# Patient Record
Sex: FEMALE | Race: WHITE | URBAN - METROPOLITAN AREA
[De-identification: names, ages, dates, MRNs, and addresses within clinical notes are randomized per-mention and may not be internally consistent; named-entity substitution may affect disease eponyms.]

---

## 2018-11-29 ENCOUNTER — OFFICE VISIT (OUTPATIENT)
Dept: SLEEP MEDICINE | Facility: CLINIC | Age: 48
End: 2018-11-29
Payer: MEDICAID

## 2018-11-29 VITALS
OXYGEN SATURATION: 97 % | SYSTOLIC BLOOD PRESSURE: 115 MMHG | HEIGHT: 62 IN | RESPIRATION RATE: 16 BRPM | WEIGHT: 180 LBS | HEART RATE: 79 BPM | BODY MASS INDEX: 33.13 KG/M2 | DIASTOLIC BLOOD PRESSURE: 83 MMHG

## 2018-11-29 DIAGNOSIS — R40.0 SLEEPINESS: Primary | ICD-10-CM

## 2018-11-29 DIAGNOSIS — G47.00 INSOMNIA, UNSPECIFIED TYPE: ICD-10-CM

## 2018-11-29 DIAGNOSIS — G71.11 MYOTONIC DYSTROPHY (H): ICD-10-CM

## 2018-11-29 PROCEDURE — 99204 OFFICE O/P NEW MOD 45 MIN: CPT | Performed by: PHYSICIAN ASSISTANT

## 2018-11-29 RX ORDER — GLUCOSAMINE/D3/BOSWELLIA SERRA 1500MG-400
TABLET ORAL
COMMUNITY

## 2018-11-29 NOTE — LETTER
11/29/2018        RE: Rae Peters  [address] [facility]  [address]  [facility] [address]            Sleep Consultation:    Date on this visit: 11/29/2018    Rae Peters is sent by No ref. provider found for a sleep consultation regarding LEV.    Primary Physician: Jannette Funk     Rae Peters presents for evaluation of sleep apnea at the request of her neurologist. She has difficulty falling asleep but daytime sleepiness. Her medical history is significant for myotonic dystrophy and depression.    Rae goes to sleep at 9:00-10:00 PM during the week. She wakes up at 7:30 AM with an alarm. She does hit the snooze for 10 minutes. She falls asleep in 30 minutes with Tylenol PM.  Rae has difficulty falling asleep without it. She takes Tylenol PM. She has been taking it more regularly since having a foot surgery. She has a racing mind.  She wakes up 1-2 times a night for 30 minutes before falling back to sleep.  Rae wakes up to go to the bathroom and worrying.  On weekends, Rae goes to sleep at 12:00 AM.  She wakes up at 10:30 AM without an alarm. She falls asleep in 30 minutes.  Patient gets an average of 8-10 hours of sleep per night. She does not feel refreshed from her sleep. That has been the case as long as she can remember. She has tried melatonin in the past but it did not help much.     Patient does use electronics in bed, worry in bed about work and read in bed and does not watch TV in bed.     Rae does not do shift work.  She works day shifts.  She works in [employer]. She lives with her mother. She is .    Rae has never been told that she snores. Patient does not have a regular bed partner. She has not shared a room with anyone since 2015.  There is not report of gasping and snorting.  She does not have witnessed apneas. She has been on oxygen after her last 2 surgeries. She thinks she had pneumonia or bronchitis. Patient sleeps on her side. She  infrequently wakes with headaches. She denies snort arousals and restless legs. She does occasionally have dry mouth in the morning. Rae denies any bruxism, sleep walking, sleep talking, dream enactment, sleep paralysis, cataplexy and hypnogogic/hypnopompic hallucinations.    Rae denies difficulty breathing through her nose, claustrophobia, reflux at night and heartburn.  She takes omeprazole 2-3 times per day. She would get stomach aches. She was recently prescribed Cymbalta, but it made her more tired. She stopped taking it. She feels her memory is very poor. She has brain fog. She has depression related to her son dying from pneumonia.    Rae has not had a significant change in weight in the last 5 years.  Patient describes themself as a night person.  She would prefer to go to sleep at 11:00 PM and wake up at 12:00 PM.  Patient's [place] Sleepiness score 6/24 inconsistent with daytime sleepiness.  She is on methylphenidate XR once daily, unsure of dose. She has been on that for maybe 5-10 years. She says she has no energy regardless of the medication. Her mother says she notices a difference though. She just feels exhausted.     Rae does not take naps. She takes no inadvertant naps.  She denies dozing while driving (except when she was on Cymbalta). Patient was counseled on the importance of driving while alert, to pull over if drowsy, or nap before getting into the vehicle if sleepy.  She uses 1-2 sodas/day. Last caffeine intake is usually before 1 PM.    Allergies:    No Known Allergies    Medications:    Current Outpatient Prescriptions   Medication Sig Dispense Refill     Acetaminophen (TYLENOL PO) Take 500 mg by mouth       Biotin 56973 MCG TABS        diphenhydrAMINE-acetaminophen (TYLENOL PM)  MG tablet Take 2 tablets by mouth nightly as needed for sleep       LORATADINE 10 MG OR TABS 1 TABLET DAILY 30 prn     METHYLPHENIDATE HCL PO        MULTIVITAMINS OR CAPS 1 tab daily 100 prn      OMEPRAZOLE PO        ROBITUSSIN A-C  MG/5ML OR SYRP 1-2 tsp PO QHS PRN cough (Patient not taking: No sig reported) 4 oz 0       Problem List:  Patient Active Problem List    Diagnosis Date Noted     Myotonic dystrophy (H) 11/29/2018     Priority: Medium        Past Medical/Surgical History:  Past Medical History:   Diagnosis Date     Acid reflux      Depressive disorder      Myotonic dystrophy (H)      Past Surgical History:   Procedure Laterality Date     ENT SURGERY      tonsil and adenoidectomy     EYE SURGERY      cataract     HYSTERECTOMY       ORTHOPEDIC SURGERY Right     foot       Social History:  Social History     Social History     Marital status: Single     Spouse name: N/A     Number of children: N/A     Years of education: N/A     Occupational History     Not on file.     Social History Main Topics     Smoking status: Never Smoker     Smokeless tobacco: Never Used     Alcohol use No     Drug use: No     Sexual activity: Not Currently     Other Topics Concern     Not on file     Social History Narrative       Family History:  Family History   Problem Relation Age of Onset     Diabetes No family hx of      Coronary Artery Disease No family hx of      Cerebrovascular Disease No family hx of        Review of Systems:  A complete review of systems reviewed by me is negative with the exeption of what has been mentioned in the history of present illness.  CONSTITUTIONAL: NEGATIVE for weight gain/loss, fever, chills, sweats or night sweats, drug allergies.  EYES: NEGATIVE for blind spots, double vision.  EYES:  POSITIVE for changes in vision  ENT: NEGATIVE for ear pain, sore throat, sinus pain, post-nasal drip, bloody nose  ENT:  POSITIVE for  runny nose  CARDIAC: NEGATIVE for fast heartbeats or fluttering in chest, chest pain or pressure, breathlessness when lying flat, swollen legs or swollen feet.  NEUROLOGIC: NEGATIVE numbness in the arms or legs.  NEUROLOGIC:  POSITIVE for  headaches and  "weakness in the arms or legs  DERMATOLOGIC: NEGATIVE for rashes, new moles or change in mole(s)  PULMONARY: NEGATIVE SOB at rest, dry cough, productive cough, coughing up blood, whistling when breathing.    PULMONARY:  POSITIVE for  SOB with activity and wheezing   GASTROINTESTINAL: NEGATIVE for vomitting, fat or grease in stools, constipation, bowel movements black in color or blood noted.  GASTROINTESTINAL:  POSITIVE for  nausea, loose or watery stools and abdominal pain  GENITOURINARY: NEGATIVE for pain during urination, blood in urine, urinating more frequently than usual, irregular menstrual periods.  MUSCULOSKELETAL: NEGATIVE for swollen joints.  MUSCULOSKELETAL:  POSITIVE for  muscle pain and bone or joint pain  ENDOCRINE: NEGATIVE for increased thirst or urination, diabetes.  LYMPHATIC: NEGATIVE for swollen lymph nodes, lumps or bumps in the breasts or nipple discharge.  MENTAL HEALTH: POSITIVE for depression    Physical Examination:  Vitals: /90  Pulse 79  Resp 16  Ht 1.575 m (5' 2\")  Wt 81.6 kg (180 lb)  SpO2 97%  BMI 32.92 kg/m2  BMI= Body mass index is 32.92 kg/(m^2).    Neck Cir (cm): 34 cm    [place] Total Score 11/29/2018   Total score - [place] 6       KYLAH Total Score: 19 (11/29/18 1513)    GENERAL APPEARANCE: healthy, alert, no distress and cooperative  EYES: Eyes grossly normal to inspection, PERRL, conjunctivae and sclerae normal and lids and lashes normal  HENT: nose and mouth without ulcers or lesions and tongue base enlarged  NECK: no adenopathy, no asymmetry, masses, or scars, thyroid normal to palpation and trachea midline and normal to palpation  RESP: lungs clear to auscultation - no rales, rhonchi or wheezes  CV: regular rates and rhythm, normal S1 S2, no S3 or S4, no murmur, click or rub and no irregular beats  LYMPHATICS: no cervical adenopathy  MS: extremities normal- no gross deformities noted  NEURO: mentation intact, speech normal, cranial nerves 2-12 intact and walk with " assist of a cane. She was examined in the room chair due to difficulty climbing onto the exam table.   Mallampati Class: II.  Tonsillar Stage: 0  surgically removed.    Impression/Plan:    (R40.0) Sleepiness  (primary encounter diagnosis), (G71.11) Myotonic dystrophy (H)  Comment: Rae is seen to evaluate for sleep disordered breathing. She has myotonic dystrophy, she is unsure which type. She is not told that she snores, but she is not closely observed while sleeping. She did have respiratory complications after her last 2 surgeries, but she thinks she had pneumonia or bronchitis. She does not feel refreshed by her sleep and she can sleep into the middle of the day if she has nothing to do. That being said, she does not nap or doze inadvertently once she is up for the day. Her ESS is normal at 6/24. She is on methylphenidate but does not know how much. She still feels tired. She does not have HTN. Her BMI is 32, age 48, neck 34 cm, gender female. Her positive risk factors for LEV on the STOP BANG are age of 48 and sleepiness. Myotonic dystrophy does confer risk for hypoxemia/hypoventilation. Her daytime SpO2 is 97% today. There are no labs in her chart measuring CO2. She does infrequently wake with headaches.   Plan: Comprehensive Sleep Study        Split night PSG with CPAP/BiPAP titration if indicated. TCM to evaluate for hypoventilation.      (G47.00) Insomnia, unspecified type  Comment: She has difficulty falling asleep and staying asleep. She has a racing mind, thinking about work (which she does not like). She is in bed for 9.5-10.5 hours per night. She seems to have a delayed sleep preference as well. On days off, she likes to sleep in, sometimes to as late as noon. She is often on her tablet for an hour or more before bedtime. She takes Tylenol PM to help her sleep and feels it is helpful.  Plan: We talked about compressing her sleep and using bright light and consistent sleep scheduling to improve her  sleep. She seemed opposed to doing things that might be uncomfortable or out of her routine to improve her sleep. We will discuss treatment options further at her follow up.       Literature provided regarding sleep apnea.      She will follow up with me in approximately two weeks after her sleep study has been competed to review the results and discuss plan of care.       Polysomnography reviewed.  Obstructive sleep apnea reviewed.  Complications of untreated sleep apnea were reviewed.  45 minutes was spent during this visit, over 50% in counseling and coordination of care.   Bennett Goltz, PA-C    CC: Henry Feliciano MD  [facility]              Sincerely,        Bennett Ezra Goltz, PA-C

## 2018-11-29 NOTE — PROGRESS NOTES
Sleep Consultation:    Date on this visit: 11/29/2018    Rae Peters is sent by No ref. provider found for a sleep consultation regarding LEV.    Primary Physician: Jannette Funk     Rae Peters presents for evaluation of sleep apnea at the request of her neurologist. She has difficulty falling asleep but daytime sleepiness. Her medical history is significant for myotonic dystrophy and depression.    Rae goes to sleep at 9:00-10:00 PM during the week. She wakes up at 7:30 AM with an alarm. She does hit the snooze for 10 minutes. She falls asleep in 30 minutes with Tylenol PM.  Rae has difficulty falling asleep without it. She takes Tylenol PM. She has been taking it more regularly since having a foot surgery. She has a racing mind.  She wakes up 1-2 times a night for 30 minutes before falling back to sleep.  Rae wakes up to go to the bathroom and worrying.  On weekends, Rae goes to sleep at 12:00 AM.  She wakes up at 10:30 AM without an alarm. She falls asleep in 30 minutes.  Patient gets an average of 8-10 hours of sleep per night. She does not feel refreshed from her sleep. That has been the case as long as she can remember. She has tried melatonin in the past but it did not help much.     Patient does use electronics in bed, worry in bed about work and read in bed and does not watch TV in bed.     Rae does not do shift work.  She works day shifts.  She works in . She lives with her mother. She is .    Rae has never been told that she snores. Patient does not have a regular bed partner. She has not shared a room with anyone since 2015.  There is not report of gasping and snorting.  She does not have witnessed apneas. She has been on oxygen after her last 2 surgeries. She thinks she had pneumonia or bronchitis. Patient sleeps on her side. She infrequently wakes with headaches. She denies snort arousals and restless legs. She does occasionally have  dry mouth in the morning. Rae denies any bruxism, sleep walking, sleep talking, dream enactment, sleep paralysis, cataplexy and hypnogogic/hypnopompic hallucinations.    Rae denies difficulty breathing through her nose, claustrophobia, reflux at night and heartburn.  She takes omeprazole 2-3 times per day. She would get stomach aches. She was recently prescribed Cymbalta, but it made her more tired. She stopped taking it. She feels her memory is very poor. She has brain fog. She has depression related to her son dying from pneumonia.    Rae has not had a significant change in weight in the last 5 years.  Patient describes themself as a night person.  She would prefer to go to sleep at 11:00 PM and wake up at 12:00 PM.  Patient's [place] Sleepiness score 6/24 inconsistent with daytime sleepiness.  She is on methylphenidate XR once daily, unsure of dose. She has been on that for maybe 5-10 years. She says she has no energy regardless of the medication. Her mother says she notices a difference though. She just feels exhausted.     Rae does not take naps. She takes no inadvertant naps.  She denies dozing while driving (except when she was on Cymbalta). Patient was counseled on the importance of driving while alert, to pull over if drowsy, or nap before getting into the vehicle if sleepy.  She uses 1-2 sodas/day. Last caffeine intake is usually before 1 PM.    Allergies:    No Known Allergies    Medications:    Current Outpatient Prescriptions   Medication Sig Dispense Refill     Acetaminophen (TYLENOL PO) Take 500 mg by mouth       Biotin 25917 MCG TABS        diphenhydrAMINE-acetaminophen (TYLENOL PM)  MG tablet Take 2 tablets by mouth nightly as needed for sleep       LORATADINE 10 MG OR TABS 1 TABLET DAILY 30 prn     METHYLPHENIDATE HCL PO        MULTIVITAMINS OR CAPS 1 tab daily 100 prn     OMEPRAZOLE PO        ROBITUSSIN A-C  MG/5ML OR SYRP 1-2 tsp PO QHS PRN cough (Patient not taking:  No sig reported) 4 oz 0       Problem List:  Patient Active Problem List    Diagnosis Date Noted     Myotonic dystrophy (H) 11/29/2018     Priority: Medium        Past Medical/Surgical History:  Past Medical History:   Diagnosis Date     Acid reflux      Depressive disorder      Myotonic dystrophy (H)      Past Surgical History:   Procedure Laterality Date     ENT SURGERY      tonsil and adenoidectomy     EYE SURGERY      cataract     HYSTERECTOMY       ORTHOPEDIC SURGERY Right     foot       Social History:  Social History     Social History     Marital status: Single     Spouse name: N/A     Number of children: N/A     Years of education: N/A     Occupational History     Not on file.     Social History Main Topics     Smoking status: Never Smoker     Smokeless tobacco: Never Used     Alcohol use No     Drug use: No     Sexual activity: Not Currently     Other Topics Concern     Not on file     Social History Narrative       Family History:  Family History   Problem Relation Age of Onset     Diabetes No family hx of      Coronary Artery Disease No family hx of      Cerebrovascular Disease No family hx of        Review of Systems:  A complete review of systems reviewed by me is negative with the exeption of what has been mentioned in the history of present illness.  CONSTITUTIONAL: NEGATIVE for weight gain/loss, fever, chills, sweats or night sweats, drug allergies.  EYES: NEGATIVE for blind spots, double vision.  EYES:  POSITIVE for changes in vision  ENT: NEGATIVE for ear pain, sore throat, sinus pain, post-nasal drip, bloody nose  ENT:  POSITIVE for  runny nose  CARDIAC: NEGATIVE for fast heartbeats or fluttering in chest, chest pain or pressure, breathlessness when lying flat, swollen legs or swollen feet.  NEUROLOGIC: NEGATIVE numbness in the arms or legs.  NEUROLOGIC:  POSITIVE for  headaches and weakness in the arms or legs  DERMATOLOGIC: NEGATIVE for rashes, new moles or change in mole(s)  PULMONARY:  "NEGATIVE SOB at rest, dry cough, productive cough, coughing up blood, whistling when breathing.    PULMONARY:  POSITIVE for  SOB with activity and wheezing   GASTROINTESTINAL: NEGATIVE for vomitting, fat or grease in stools, constipation, bowel movements black in color or blood noted.  GASTROINTESTINAL:  POSITIVE for  nausea, loose or watery stools and abdominal pain  GENITOURINARY: NEGATIVE for pain during urination, blood in urine, urinating more frequently than usual, irregular menstrual periods.  MUSCULOSKELETAL: NEGATIVE for swollen joints.  MUSCULOSKELETAL:  POSITIVE for  muscle pain and bone or joint pain  ENDOCRINE: NEGATIVE for increased thirst or urination, diabetes.  LYMPHATIC: NEGATIVE for swollen lymph nodes, lumps or bumps in the breasts or nipple discharge.  MENTAL HEALTH: POSITIVE for depression    Physical Examination:  Vitals: /90  Pulse 79  Resp 16  Ht 1.575 m (5' 2\")  Wt 81.6 kg (180 lb)  SpO2 97%  BMI 32.92 kg/m2  BMI= Body mass index is 32.92 kg/(m^2).    Neck Cir (cm): 34 cm    [place] Total Score 11/29/2018   Total score - [place] 6       KYLAH Total Score: 19 (11/29/18 1513)    GENERAL APPEARANCE: healthy, alert, no distress and cooperative  EYES: Eyes grossly normal to inspection, PERRL, conjunctivae and sclerae normal and lids and lashes normal  HENT: nose and mouth without ulcers or lesions and tongue base enlarged  NECK: no adenopathy, no asymmetry, masses, or scars, thyroid normal to palpation and trachea midline and normal to palpation  RESP: lungs clear to auscultation - no rales, rhonchi or wheezes  CV: regular rates and rhythm, normal S1 S2, no S3 or S4, no murmur, click or rub and no irregular beats  LYMPHATICS: no cervical adenopathy  MS: extremities normal- no gross deformities noted  NEURO: mentation intact, speech normal, cranial nerves 2-12 intact and walk with assist of a cane. She was examined in the room chair due to difficulty climbing onto the exam table. "   Mallampati Class: II.  Tonsillar Stage: 0  surgically removed.    Impression/Plan:    (R40.0) Sleepiness  (primary encounter diagnosis), (G71.11) Myotonic dystrophy (H)  Comment: Rae is seen to evaluate for sleep disordered breathing. She has myotonic dystrophy, she is unsure which type. She is not told that she snores, but she is not closely observed while sleeping. She did have respiratory complications after her last 2 surgeries, but she thinks she had pneumonia or bronchitis. She does not feel refreshed by her sleep and she can sleep into the middle of the day if she has nothing to do. That being said, she does not nap or doze inadvertently once she is up for the day. Her ESS is normal at 6/24. She is on methylphenidate but does not know how much. She still feels tired. She does not have HTN. Her BMI is 32, age 48, neck 34 cm, gender female. Her positive risk factors for ELV on the STOP BANG are age of 48 and sleepiness. Myotonic dystrophy does confer risk for hypoxemia/hypoventilation. Her daytime SpO2 is 97% today. There are no labs in her chart measuring CO2. She does infrequently wake with headaches.   Plan: Comprehensive Sleep Study        Split night PSG with CPAP/BiPAP titration if indicated. TCM to evaluate for hypoventilation.      (G47.00) Insomnia, unspecified type  Comment: She has difficulty falling asleep and staying asleep. She has a racing mind, thinking about work (which she does not like). She is in bed for 9.5-10.5 hours per night. She seems to have a delayed sleep preference as well. On days off, she likes to sleep in, sometimes to as late as noon. She is often on her tablet for an hour or more before bedtime. She takes Tylenol PM to help her sleep and feels it is helpful.  Plan: We talked about compressing her sleep and using bright light and consistent sleep scheduling to improve her sleep. She seemed opposed to doing things that might be uncomfortable or out of her routine to improve  her sleep. We will discuss treatment options further at her follow up.       Literature provided regarding sleep apnea.      She will follow up with me in approximately two weeks after her sleep study has been competed to review the results and discuss plan of care.       Polysomnography reviewed.  Obstructive sleep apnea reviewed.  Complications of untreated sleep apnea were reviewed.  45 minutes was spent during this visit, over 50% in counseling and coordination of care.   Bennett Goltz, PA-C    CC: Henry Feliciano MD  [facility]

## 2018-11-29 NOTE — MR AVS SNAPSHOT
"              After Visit Summary   11/29/2018    Rae Peters    MRN: [id number]           Patient Information     Date Of Birth          1970        Visit Information        Provider Department      11/29/2018 3:30 PM Goltz, Bennett Ezra, PA-C [facility]        Today's Diagnoses     Sleepiness    -  1    Myotonic dystrophy (H)        Insomnia, unspecified type          Care Instructions    MY TREATMENT INFORMATION FOR SLEEP APNEA-  Rae RICHARD Fran    DOCTOR : Bennett Ezra Goltz, PA-C  SLEEP CENTER : [facility]     MY CONTACT NUMBER: [phone]    Am I having a sleep study at a sleep center?  Make sure you have an appointment for the study before you leave!    Am I having a home sleep study?  Watch this video:  [email or web address]?v=[email or web address]  Please verify your insurance coverage with your insurance carrier    Frequently asked questions:  1. What is Obstructive Sleep Apnea (LEV)? LEV is the most common type of sleep apnea. Apnea means, \"without breath.\"  Apnea is most often caused by narrowing or collapse of the upper airway as muscles relax during sleep.   Almost everyone has occasional apneas. Most people with sleep apnea have had brief interruptions at night frequently for many years.  The severity of sleep apnea is related to how frequent and severe the events are.   2. What are the consequences of LEV? Symptoms include: feeling sleepy during the day, snoring loudly, gasping or stopping of breathing, trouble sleeping, and occasionally morning headaches or heartburn at night.  Sleepiness can be serious and even increase the risk of falling asleep while driving. Other health consequences may include development of high blood pressure and other cardiovascular disease in persons who are susceptible. Untreated LEV  can contribute to heart disease, stroke and diabetes.   3. What are the treatment options? In most situations, sleep " apnea is a lifelong disease that must be managed with daily therapy. Medications are not effective for sleep apnea and surgery is generally not considered until other therapies have been tried. Your treatment is your choice . Continuous Positive Airway (CPAP) works right away and is the therapy that is effective in nearly everyone. An oral device to hold your jaw forward is usually the next most reliable option. Other options include postioning devices (to keep you off your back), weight loss, and surgery including a tongue pacing device. There is more detail about some of these options below.    Important tips for using CPAP and similar devices   Know your equipment:  CPAP is continuous positive airway pressure that prevents obstructive sleep apnea by keeping the throat from collapsing while you are sleeping. In most cases, the device is  smart  and can slowly self-adjusts if your throat collapses and keeps a record every day of how well you are treated-this information is available to you and your care team.  BPAP is bilevel positive airway pressure that keeps your throat open and also assists each breath with a pressure boost to maintain adequate breathing.  Special kinds of BPAP are used in patients who have inadequate breathing from lung or heart disease. In most cases, the device is  smart  and can slowly self-adjusts to assist breathing. Like CPAP, the device keeps a record of how well you are treated.  Your mask is your connection to the device. You get to choose what feels most comfortable and the staff will help to make sure if fits. Here: are some examples of the different masks that are available:       Key points to remember on your journey with sleep apnea:  1. Sleep study.  PAP devices often need to be adjusted during a sleep study to show that they are effective and adjusted right.  2. Good tips to remember: Try wearing just the mask during a quiet time during the day so your body adapts to wearing  it. A humidifier is recommended for comfort in most cases to prevent drying of your nose and throat. Allergy medication from your provider may help you if you are having nasal congestion.  3. Getting settled-in. It takes more than one night for most of us to get used to wearing a mask. Try wearing just the mask during a quiet time during the day so your body adapts to wearing it. A humidifier is recommended for comfort in most cases. Our team will work with you carefully on the first day and will be in contact within 4 days and again at 2 and 4 weeks for advice and remote device adjustments. Your therapy is evaluated by the device each day.   4. Use it every night. The more you are able to sleep naturally for 7-8 hours, the more likely you will have good sleep and to prevent health risks or symptoms from sleep apnea. Even if you use it 4 hours it helps. Occasionally all of us are unable to use a medical therapy, in sleep apnea, it is not dangerous to miss one night.   5. Communicate. Call our skilled team on the number provided on the first day if your visit for problems that make it difficult to wear the device. Over 2 out of 3 patients can learn to wear the device long-term with help from our team. Remember to call our team or your sleep providers if you are unable to wear the device as we may have other solutions for those who cannot adapt to mask CPAP therapy. It is recommended that you sleep your sleep provider within the first 3 months and yearly after that if you are not having problems.   Take care of your equipment. Make sure you clean your mask and tubing using directions every day and that your filter and mask are replaced as recommended or if they are not working.     BESIDES CPAP, WHAT OTHER THERAPIES ARE THERE?    Positioning Device  Positioning devices are generally used when sleep apnea is mild and only occurs on your back.This example shows a pillow that straps around the waist. It may be  appropriate for those whose sleep study shows milder sleep apnea that occurs primarily when lying flat on one's back. Preliminary studies have shown benefit but effectiveness at home may need to be verified by a home sleep test. These devices are generally not covered by medical insurance.  Examples of devices that maintain sleeping on the back to prevent snoring and mild sleep apnea.    Belt type body positioner  Http://Juxinli.com/    Electronic reminder  Http://nightshifttherapy.com/  Http://www.Sciona.Evident.io.au/    Oral Appliance  What is oral appliance therapy?  An oral appliance device fits on your teeth at night like a retainer used after having braces. The device is made by a specialized dentist and requires several visits over 1-2 months before a manufactured device is made to fit your teeth and is adjusted to prevent your sleep apnea. Once an oral device is working properly, snoring should be improved. A home sleep test may be recommended at that time if to determine whether the sleep apnea is adequately treated.       Some things to remember:  -Oral devices are often, but not always, covered by your medical insurance. Be sure to check with your insurance provider.   -If you are referred for oral therapy, you will be given a list of specialized dentists to consider or you may choose to visit the Web site of the American Academy of Dental Sleep Medicine  -Oral devices are less likely to work if you have severe sleep apnea or are extremely overweight.     More detailed information  An oral appliance is a small acrylic device that fits over the upper and lower teeth  (similar to a retainer or a mouth guard). This device slightly moves jaw forward, which moves the base of the tongue forward, opens the airway, improves breathing for effective treat snoring and obstructive sleep apnea in perhaps 7 out of 10 people .  The best working devices are custom-made by a dental device  after a mold is made of  the teeth 1, 2, 3.  When is an oral appliance indicated?  Oral appliance therapy is recommended as a first-line treatment for patients with primary snoring, mild sleep apnea, and for patients with moderate sleep apnea who prefer appliance therapy to use of CPAP4, 5. Severity of sleep apnea is determined by sleep testing and is based on the number of respiratory events per hour of sleep.   How successful is oral appliance therapy?  The success rate of oral appliance therapy in patients with mild sleep apnea is 75-80% while in patients with moderate sleep apnea it is 50-70%. The chance of success in patients with severe sleep apnea is 40-50%. The research also shows that oral appliances have a beneficial effect on the cardiovascular health of LEV patients at the same magnitude as CPAP therapy7.  Oral appliances should be a second-line treatment in cases of severe sleep apnea, but if not completely successful then a combination therapy utilizing CPAP plus oral appliance therapy may be effective. Oral appliances tend to be effective in a broad range of patients although studies show that the patients who have the highest success are females, younger patients, those with milder disease, and less severe obesity. 3, 6.   Finding a dentist that practices dental sleep medicine  Specific training is available through the American Academy of Dental Sleep Medicine for dentists interested in working in the field of sleep. To find a dentist who is educated in the field of sleep and the use of oral appliances, near you, visit the Web site of the American Academy of Dental Sleep Medicine.    References  1. Dany, et al. Objectively measured vs self-reported compliance during oral appliance therapy for sleep-disordered breathing. Chest 2013; 144(5): 9430-3038.  2. Melissa et al. Objective measurement of compliance during oral appliance therapy for sleep-disordered breathing. Thorax 2013; 68(1): 91-96.  3. Dorothea et al.  Mandibular advancement devices in 620 men and women with LEV and snoring: tolerability and predictors of treatment success. Chest 2004; 125: 8574-0182.  4. Ross et al. Oral appliances for snoring and LEV: a review. Sleep 2006; 29: 244-262.  5. Pedrito et al. Oral appliance treatment for LEV: an update. J Clin Sleep Med 2014; 10(2): 215-227.  6. Tommy et al. Predictors of OSAH treatment outcome. J Dent Res 2007; 86: 1565-4922.  Weight Loss:    Weight loss is a long-term strategy that may improve sleep apnea in some patients.    Weight management is a personal decision and the decision should be based on your interest and the potential benefits.  If you are interested in exploring weight loss strategies, the following discussion covers the impact on weight loss on sleep apnea and the approaches that may be successful.    Being overweight does not necessarily mean you will have health consequences.  Those who have BMI over 35 or over 27 with existing medical conditions carries greater risk.   Weight loss decreases severity of sleep apnea in most people with obesity. For those with mild obesity who have developed snoring with weight gain, even 15-30 pound weight loss can improve and occasionally eliminate sleep apnea.  Structured and life-long dietary and health habits are necessary to lose weight and keep healthier weight levels.     Though there may be significant health benefits from weight loss, long-term weight loss is very difficult to achieve- studies show success with dietary management in less than 10% of people. In addition, substantial weight loss may require years of dietary control and may be difficult if patients have severe obesity. In these cases, surgical management may be considered.  Finally, older individuals who have tolerated obesity without health complications may be less likely to benefit from weight loss strategies.      Your BMI is Body mass index is 32.92 kg/(m^2).  Weight  management is a personal decision.  If you are interested in exploring weight loss strategies, the following discussion covers the approaches that may be successful. Body mass index (BMI) is one way to tell whether you are at a healthy weight, overweight, or obese. It measures your weight in relation to your height.  A BMI of 18.5 to 24.9 is in the healthy range. A person with a BMI of 25 to 29.9 is considered overweight, and someone with a BMI of 30 or greater is considered obese. More than two-thirds of American adults are considered overweight or obese.  Being overweight or obese increases the risk for further weight gain. Excess weight may lead to heart disease and diabetes.  Creating and following plans for healthy eating and physical activity may help you improve your health.  Weight control is part of healthy lifestyle and includes exercise, emotional health, and healthy eating habits. Careful eating habits lifelong are the mainstay of weight control. Though there are significant health benefits from weight loss, long-term weight loss with diet alone may be very difficult to achieve- studies show long-term success with dietary management in less than 10% of people. Attaining a healthy weight may be especially difficult to achieve in those with severe obesity. In some cases, medications, devices and surgical management might be considered.  What can you do?  If you are overweight or obese and are interested in methods for weight loss, you should discuss this with your provider.     Consider reducing daily calorie intake by 500 calories.     Keep a food journal.     Avoiding skipping meals, consider cutting portions instead.    Diet combined with exercise helps maintain muscle while optimizing fat loss. Strength training is particularly important for building and maintaining muscle mass. Exercise helps reduce stress, increase energy, and improves fitness. Increasing exercise without diet control, however, may  not burn enough calories to loose weight.       Start walking three days a week 10-20 minutes at a time    Work towards walking thirty minutes five days a week     Eventually, increase the speed of your walking for 1-2 minutes at time    In addition, we recommend that you review healthy lifestyles and methods for weight loss available through the National Institutes of Health patient information sites:  http://win.niddk.nih.gov/publications/index.htm    And look into health and wellness programs that may be available through your health insurance provider, employer, local community center, or nikita club.    Weight management plan: Patient was referred to their PCP to discuss a diet and exercise plan.    Surgery:  Surgery for obstructive sleep apnea is considered generally only when other therapies fail to work. Surgery may be discussed with you if you are having a difficult time tolerating CPAP and or when there is an abnormal structure that requires surgical correction.  Nose and throat surgeries often enlarge the airway to prevent collapse.  Most of these surgeries create pain for 1-2 weeks and up to half of the most common surgeries are not effective throughout life.  You should carefully discuss the benefits and drawbacks to surgery with your sleep provider and surgeon to determine if it is the best solution for you.   More information  Surgery for LEV is directed at areas that are responsible for narrowing or complete obstruction of the airway during sleep.  There are a wide range of procedures available to enlarge and/or stabilize the airway to prevent blockage of breathing in the three major areas where it can occur: the palate, tongue, and nasal regions.  Successful surgical treatment depends on the accurate identification of the factors responsible for obstructive sleep apnea in each person.  A personalized approach is required because there is no single treatment that works well for everyone.  Because of  anatomic variation, consultation with an examination by a sleep surgeon is a critical first step in determining what surgical options are best for each patient.  In some cases, examination during sedation may be recommended in order to guide the selection of procedures.  Patients will be counseled about risks and benefits as well as the typical recovery course after surgery. Surgery is typically not a cure for a person s LEV.  However, surgery will often significantly improve one s LEV severity (termed  success rate ).  Even in the absence of a cure, surgery will decrease the cardiovascular risk associated with OSA7; improve overall quality of life8 (sleepiness, functionality, sleep quality, etc).  Palate Procedures:  Patients with LEV often have narrowing of their airway in the region of their tonsils and uvula.  The goals of palate procedures are to widen the airway in this region as well as to help the tissues resist collapse.  Modern palate procedure techniques focus on tissue conservation and soft tissue rearrangement, rather than tissue removal.  Often the uvula is preserved in this procedure. Residual sleep apnea is common in patient after pharyngoplasty with an average reduction in sleep apnea events of 33%2.    Tongue Procedures:  ExamWhile patients are awake, the muscles that surround the throat are active and keep this region open for breathing. These muscles relax during sleep, allowing the tongue and other structures to collapse and block breathing.  There are several different tongue procedures available.  Selection of a tongue base procedure depends on characteristics seen on physical exam.  Generally, procedures are aimed at removing bulky tissues in this area or preventing the back of the tongue from falling back during sleep.  Success rates for tongue surgery range from 50-62%3.  Hypoglossal Nerve Stimulation:  Hypoglossal nerve stimulation has recently received approval from the [place] Food  and Drug Administration for the treatment of obstructive sleep apnea.  This is based on research showing that the system was safe and effective in treating sleep apnea6.  Results showed that the median AHI score decreased 68%, from 29.3 to 9.0. This therapy uses an implant system that senses breathing patterns and delivers mild stimulation to airway muscles, which keeps the airway open during sleep.  The system consists of three fully implanted components: a small generator (similar in size to a pacemaker), a breathing sensor, and a stimulation lead.  Using a small handheld remote, a patient turns the therapy on before bed and off upon awakening.    Candidates for this device must be greater than 22 years of age, have moderate to severe LEV (AHI between 20-65), BMI less than 32, have tried CPAP/oral appliance without success, and have appropriate upper airway anatomy (determined by a sleep endoscopy performed by Dr. Alvarado).  Hypoglossal Nerve Stimulation Pathway:    The sleep surgeon s office will work with the patient through the insurance prior-authorization process (including communications and appeals).    Nasal Procedures:  Nasal obstruction can interfere with nasal breathing during the day and night.  Studies have shown that relief of nasal obstruction can improve the ability of some patients to tolerate positive airway pressure therapy for obstructive sleep apnea1.  Treatment options include medications such as nasal saline, topical corticosteroid and antihistamine sprays, and oral medications such as antihistamines or decongestants. Non-surgical treatments can include external nasal dilators for selected patients. If these are not successful by themselves, surgery can improve the nasal airway either alone or in combination with these other options.  Combination Procedures:  Combination of surgical procedures and other treatments may be recommended, particularly if patients have more than one area of narrowing  or persistent positional disease.  The success rate of combination surgery ranges from 66-80%2,3.    References  1. Jerardo VILLELA. The Role of the Nose in Snoring and Obstructive Sleep Apnoea: An Update.  Eur Arch Otorhinolaryngol. 2011; 268: 1365-73.  2.  Gisela SM; Jose Luis JA; Eugenia JR; Pallanch JF; Roberto Carlos MB; Tessa SG; Monica REYNAGA. Surgical modifications of the upper airway for obstructive sleep apnea in adults: a systematic review and meta-analysis. SLEEP 2010;33(10):6851-2372. Nithin PARDO. Hypopharyngeal surgery in obstructive sleep apnea: an evidence-based medicine review.  Arch Otolaryngol Head Neck Surg. 2006 Feb;132(2):206-13.  3. Rojelio YH1, Jennifer Y, Loc MAURO. The efficacy of anatomically based multilevel surgery for obstructive sleep apnea. Otolaryngol Head Neck Surg. 2003 Oct;129(4):327-35.  4. Nithin PARDO, Goldberg A. Hypopharyngeal Surgery in Obstructive Sleep Apnea: An Evidence-Based Medicine Review. Arch Otolaryngol Head Neck Surg. 2006 Feb;132(2):206-13.  5. Hectoro PJ et al. Upper-Airway Stimulation for Obstructive Sleep Apnea.  N Engl J Med. 2014 Jan 9;370(2):139-49.  6. Jack Y et al. Increased Incidence of Cardiovascular Disease in Middle-aged Men with Obstructive Sleep Apnea. Am J Respir Crit Care Med; 2002 166: 159-165  7. Garcia EM et al. Studying Life Effects and Effectiveness of Palatopharyngoplasty (SLEEP) study: Subjective Outcomes of Isolated Uvulopalatopharyngoplasty. Otolaryngol Head Neck Surg. 2011; 144: 623-631.    General recommendations for sleep problems (Insomnia)  Allow 2-4 weeks to see results     Establish a regular sleep schedule    Most people only need 7-8 hours of sleep.  Don't be in bed longer than you need     to sleep or you will end up spending more time awake in bed. This trains your    brain to think of the bed as a place to not sleep.  Go to bed at same time each night   Get up at same time each day - Set an alarm everyday (even weekends). This is one of    the most  important tips. It prevents you from relying on your insomnia to get you    up on time for your day. That actually reinforces insomnia. It also will help your    body get into a pattern where you start feeling tired at a consistent time each    night.  The body functions best when you keep a consistent routine.  Avoid sleeping-in and napping. Anytime you sleep during the day, you will be less tired at    night. You may be tired enough to fall asleep, but you will wake more in the    middle of the night because you will have met your sleep need before the night is    done.   Cut down time in bed (if not asleep, get up)- Use your bed only for sleep and sex    Anytime you spend time in bed doing activities other than sleep (reading,    watching TV, working, playing on the computer or phone, or even just laying in    bed trying to sleep), you are training  your brain to think of the bed as a place to    do activities other than sleep. If you are not falling asleep within 20-30 minutes,    get out of bed. While out of bed, avoid bright lights. Avoid work or chores. Being    productive in the middle of the night reinforces waking up at night. Find relaxing,    not particularly entertaining activities like reading, listening to music, or relaxation    exercises. Go back to bed if you start feeling groggy, or after about 30 minutes,    even if not feeling very tired. Sometimes, just getting out of bed stops the pattern    of getting frustrated about laying in bed not sleeping, and that can help you fall    asleep.   Avoid trying to force yourself to sleep- sleep is not like everything else. The harder you    work at most things, the more you can accomplish. The harder you work at    sleep, the less you will sleep.     Make the bedroom comfortable - quiet, dark and cool are better. Consider ear plugs    (silicon). Use dark blinds or wear an eye mask if needed     Make a relaxing routine prior to bedtime  Relaxation  exercises:   Progressive muscle relaxation: Relax each muscle group individually    Begin with your feet, flex, then relax. Try to imagine your feet feeling heavy and sinking into the bed. Move to your calves, do the same thing. Work through each muscle group toward your head.    Relaxing Mental Imagery: Try to imagine a trip that you took and found relaxing, or imagine a day at the beach. Try to walk yourself through the day in your mind as if you were dreaming it. Try to imagine sensing the different experiences, such as feeling sand between your toes, the heat of the sun on your skin, seeing the waves crashing the shore, the smell of the salt water, etc.     Deal with your worries before bedtime    Set aside a worry time around dinner time for 10-15 minutes. Write down the    things that are on your mind. Plan time in the coming days to address those    issues. Brainstorm ideas on how you will deal with them. Try to identify issues    that are out of your control, and try to let those issues go.  Listen to relaxation tapes   Classical Music or Nature sounds   Back Massage   Get regular exercise each day (at least 1-2 hours before bedtime)   Take medications only as directed   Eat a light bedtime snack or warm drink   Warm milk   Warm herbal tea (non-caffeinated)       Things to avoid   No overstimulating activities just before bed   No competitive games before bedtime   No exciting television programs before bedtime   Avoid caffeine after lunchtime   Avoid chocolate   Do not use alcohol to induce sleep (worsens Insomnia)   Do not take someone else's sleeping pills   Do not look at the clock when awakening   Do not turn on light when getting up to use bathroom, use a nightlight     Online Programs     www.SHUTi.me (pronounced shut eye). There is a fee for this program. Enter the code  [employer]  if you decide to enroll in this program.      www.sleepIO.com (pronounced sleep ee oh). There is a fee for this program.  Enter the code  [facility]  if you decide to enroll in this program.     Suggested Resources  Insomnia Treatment Books     Overcoming Insomnia by Marshall Kessler and Letha Arambula (2008)    No More Sleepless Nights by Taz Patricia and Zulema Marinelli (1996)    Say Stefani to Insomnia by Jeffry Henriquez (2009)    The Insomnia Workbook by Nuris Hilton and Jas Gilmore (2009)    The Insomnia Answer by Juan Diego Nova and Thanh Zhu (2006)      Stress Management and Relaxation Books    The Relaxation and Stress Reduction Workbook by Meryl Mendoza, Skylar Romo and Tylor Thorne (2008)    Stress Management Workbook: Techniques and Self-Assessment Procedures by Priyanka Light and Jose G Hernadez (1997)    A Mindfulness-Based Stress Reduction Workbook by Albert Salazar and Lisa Farfan (2010)    The Complete Stress Management Workbook by Rafy Chavarria, Marcio Persaud and Calvin Garcia (1996)    Assert Yourself by Angi De La Rosa and Jesus De La Rosa (1977)    Relaxation Resources for Computer Download   These websites offer resources to help you relax. This list is for information only. [facility] is not responsible for the quality of services or the actions of any person or organization.  Progressive Muscle Relaxation (PMR):     http://www.[employer]ibes/progressive-muscle-relaxation-exercise.html     http://studentsupport.[facility]/counseling/resources/self-help/relaxation-and-stress-management/   Deep Breathing Exercises:    http://www.[employer]/breathing-awareness.html     Meditation:     www.[employer]cision    www.theOriel Sea Saltguided-meditation-site.com You may have to pay for some of these resources.    Guided Imagery:    http://www.[employer]/guided-imagery-scripts.html     http://[employer]/library/djddedeuit-bdzvhi-zoptenx/     Counseling / Behavioral Health  [facility]  Visit [email or web address] or call [phone] to  "find a clinic close to you.  Or call [phone] for [facility].            Follow-ups after your visit        Your next 10 appointments already scheduled     Jan 04, 2019  8:30 PM CST   Psg Split W/Tcm with BED 5 SH SLEEP   [facility]    [address]  [facility] [address] [phone]            Jan 18, 2019  3:30 PM CST   Return Sleep Patient with Zayas Ezra Goltz, PA-C   [facility] ([facility])    [address]  [facility] [address] [phone]              Future tests that were ordered for you today     Open Future Orders        Priority Expected Expires Ordered    Comprehensive Sleep Study Routine  5/28/2019 11/29/2018            Who to contact     If you have questions or need follow up information about today's clinic visit or your schedule please contact [facility] directly at [phone].  Normal or non-critical lab and imaging results will be communicated to you by [employer]art, letter or phone within 4 business days after the clinic has received the results. If you do not hear from us within 7 days, please contact the clinic through [employer] or phone. If you have a critical or abnormal lab result, we will notify you by phone as soon as possible.  Submit refill requests through Vetr or call your pharmacy and they will forward the refill request to us. Please allow 3 business days for your refill to be completed.          Additional Information About Your Visit        Vetr Information     Vetr lets you send messages to your doctor, view your test results, renew your prescriptions, schedule appointments and more. To sign up, go to [email or web address] . Click on \"Log in\" on the left side of the screen, which will take you to the Welcome page. Then click on \"Sign up Now\" on the right side of the page.     You will be asked to " "enter the access code listed below, as well as some personal information. Please follow the directions to create your username and password.     Your access code is: [email or web address]  Expires: 2019  4:46 PM     Your access code will  in 90 days. If you need help or a new code, please call your [facility] clinic or [phone].        Care EveryWhere ID     This is your Care EveryWhere ID. This could be used by other organizations to access your [facility] medical records  [id number]        Your Vitals Were     Pulse Respirations Height Pulse Oximetry BMI (Body Mass Index)       79 16 1.575 m (5' 2\") 97% 32.92 kg/m2        Blood Pressure from Last 3 Encounters:   18 115/83   05 106/80    Weight from Last 3 Encounters:   18 81.6 kg (180 lb)   05 82.6 kg (182 lb)               Primary Care Provider Office Phone # Fax #    Jannette KIM HAILE Funk [phone]       [address] [address] [place] [address] [place] [address]        Equal Access to Services     MAIK ZABALA AH: Hadii aad ku hadasho Soomaali, waaxda luqadaha, qaybta kaalmada adeegyada, waxay tom hayjustynn antonio worthy. So [facility] [phone].    ATENCIÓN: Si habla español, tiene a humphries disposición servicios gratuitos de asistencia lingüística. Teto al [phone].    We comply with applicable federal civil rights laws and [place] laws. We do not discriminate on the basis of race, color, national origin, age, disability, sex, sexual orientation, or gender identity.            Thank you!     Thank you for choosing [facility]  for your care. Our goal is always to provide you with excellent care. Hearing back from our patients is one way we can continue to improve our services. Please take a few minutes to complete the written survey that you may receive in the mail after your visit with us. Thank you!             Your Updated Medication List - Protect others around you: Learn how " to safely use, store and throw away your medicines at www.disposemymeds.org.          This list is accurate as of 11/29/18  4:46 PM.  Always use your most recent med list.                   Brand Name Dispense Instructions for use Diagnosis    Biotin 89007 MCG Tabs           diphenhydrAMINE-acetaminophen  MG tablet    TYLENOL PM     Take 2 tablets by mouth nightly as needed for sleep        loratadine 10 MG tablet    CLARITIN    30    1 TABLET DAILY        METHYLPHENIDATE HCL PO           multivitamin capsule     100    1 tab daily        OMEPRAZOLE PO           ROBITUSSIN A-C  MG/5ML Syrp   Generic drug:  CODEINE-GUAIFENESIN     4 oz    1-2 tsp PO QHS PRN cough    Acute upper respiratory infections of unspecified site       TYLENOL PO      Take 500 mg by mouth

## 2018-11-29 NOTE — PATIENT INSTRUCTIONS
"MY TREATMENT INFORMATION FOR SLEEP APNEA-  Rae Peters    DOCTOR : Bennett Ezra Goltz, PA-C  SLEEP CENTER : [facility]     MY CONTACT NUMBER: [phone]    Am I having a sleep study at a sleep center?  Make sure you have an appointment for the study before you leave!    Am I having a home sleep study?  Watch this video:  [email or web address]/watch?v=CteI_[email or web address]&[email or web address]  Please verify your insurance coverage with your insurance carrier    Frequently asked questions:  1. What is Obstructive Sleep Apnea (LEV)? LEV is the most common type of sleep apnea. Apnea means, \"without breath.\"  Apnea is most often caused by narrowing or collapse of the upper airway as muscles relax during sleep.   Almost everyone has occasional apneas. Most people with sleep apnea have had brief interruptions at night frequently for many years.  The severity of sleep apnea is related to how frequent and severe the events are.   2. What are the consequences of LEV? Symptoms include: feeling sleepy during the day, snoring loudly, gasping or stopping of breathing, trouble sleeping, and occasionally morning headaches or heartburn at night.  Sleepiness can be serious and even increase the risk of falling asleep while driving. Other health consequences may include development of high blood pressure and other cardiovascular disease in persons who are susceptible. Untreated LEV  can contribute to heart disease, stroke and diabetes.   3. What are the treatment options? In most situations, sleep apnea is a lifelong disease that must be managed with daily therapy. Medications are not effective for sleep apnea and surgery is generally not considered until other therapies have been tried. Your treatment is your choice . Continuous Positive Airway (CPAP) works right away and is the therapy that is effective in nearly everyone. An oral device to hold your jaw forward is usually the next most reliable option. Other " options include postioning devices (to keep you off your back), weight loss, and surgery including a tongue pacing device. There is more detail about some of these options below.    Important tips for using CPAP and similar devices   Know your equipment:  CPAP is continuous positive airway pressure that prevents obstructive sleep apnea by keeping the throat from collapsing while you are sleeping. In most cases, the device is  smart  and can slowly self-adjusts if your throat collapses and keeps a record every day of how well you are treated-this information is available to you and your care team.  BPAP is bilevel positive airway pressure that keeps your throat open and also assists each breath with a pressure boost to maintain adequate breathing.  Special kinds of BPAP are used in patients who have inadequate breathing from lung or heart disease. In most cases, the device is  smart  and can slowly self-adjusts to assist breathing. Like CPAP, the device keeps a record of how well you are treated.  Your mask is your connection to the device. You get to choose what feels most comfortable and the staff will help to make sure if fits. Here: are some examples of the different masks that are available:       Key points to remember on your journey with sleep apnea:  1. Sleep study.  PAP devices often need to be adjusted during a sleep study to show that they are effective and adjusted right.  2. Good tips to remember: Try wearing just the mask during a quiet time during the day so your body adapts to wearing it. A humidifier is recommended for comfort in most cases to prevent drying of your nose and throat. Allergy medication from your provider may help you if you are having nasal congestion.  3. Getting settled-in. It takes more than one night for most of us to get used to wearing a mask. Try wearing just the mask during a quiet time during the day so your body adapts to wearing it. A humidifier is recommended for comfort  in most cases. Our team will work with you carefully on the first day and will be in contact within 4 days and again at 2 and 4 weeks for advice and remote device adjustments. Your therapy is evaluated by the device each day.   4. Use it every night. The more you are able to sleep naturally for 7-8 hours, the more likely you will have good sleep and to prevent health risks or symptoms from sleep apnea. Even if you use it 4 hours it helps. Occasionally all of us are unable to use a medical therapy, in sleep apnea, it is not dangerous to miss one night.   5. Communicate. Call our skilled team on the number provided on the first day if your visit for problems that make it difficult to wear the device. Over 2 out of 3 patients can learn to wear the device long-term with help from our team. Remember to call our team or your sleep providers if you are unable to wear the device as we may have other solutions for those who cannot adapt to mask CPAP therapy. It is recommended that you sleep your sleep provider within the first 3 months and yearly after that if you are not having problems.   Take care of your equipment. Make sure you clean your mask and tubing using directions every day and that your filter and mask are replaced as recommended or if they are not working.     BESIDES CPAP, WHAT OTHER THERAPIES ARE THERE?    Positioning Device  Positioning devices are generally used when sleep apnea is mild and only occurs on your back.This example shows a pillow that straps around the waist. It may be appropriate for those whose sleep study shows milder sleep apnea that occurs primarily when lying flat on one's back. Preliminary studies have shown benefit but effectiveness at home may need to be verified by a home sleep test. These devices are generally not covered by medical insurance.  Examples of devices that maintain sleeping on the back to prevent snoring and mild sleep apnea.    Belt type body  positioner  Http://[employer]/    Electronic reminder  Http://nightshifttherapy.com/  Http://www.[employer].com.au/    Oral Appliance  What is oral appliance therapy?  An oral appliance device fits on your teeth at night like a retainer used after having braces. The device is made by a specialized dentist and requires several visits over 1-2 months before a manufactured device is made to fit your teeth and is adjusted to prevent your sleep apnea. Once an oral device is working properly, snoring should be improved. A home sleep test may be recommended at that time if to determine whether the sleep apnea is adequately treated.       Some things to remember:  -Oral devices are often, but not always, covered by your medical insurance. Be sure to check with your insurance provider.   -If you are referred for oral therapy, you will be given a list of specialized dentists to consider or you may choose to visit the Web site of the American Academy of Dental Sleep Medicine  -Oral devices are less likely to work if you have severe sleep apnea or are extremely overweight.     More detailed information  An oral appliance is a small acrylic device that fits over the upper and lower teeth  (similar to a retainer or a mouth guard). This device slightly moves jaw forward, which moves the base of the tongue forward, opens the airway, improves breathing for effective treat snoring and obstructive sleep apnea in perhaps 7 out of 10 people .  The best working devices are custom-made by a dental device  after a mold is made of the teeth 1, 2, 3.  When is an oral appliance indicated?  Oral appliance therapy is recommended as a first-line treatment for patients with primary snoring, mild sleep apnea, and for patients with moderate sleep apnea who prefer appliance therapy to use of CPAP4, 5. Severity of sleep apnea is determined by sleep testing and is based on the number of respiratory events per hour of sleep.   How successful  is oral appliance therapy?  The success rate of oral appliance therapy in patients with mild sleep apnea is 75-80% while in patients with moderate sleep apnea it is 50-70%. The chance of success in patients with severe sleep apnea is 40-50%. The research also shows that oral appliances have a beneficial effect on the cardiovascular health of LEV patients at the same magnitude as CPAP therapy7.  Oral appliances should be a second-line treatment in cases of severe sleep apnea, but if not completely successful then a combination therapy utilizing CPAP plus oral appliance therapy may be effective. Oral appliances tend to be effective in a broad range of patients although studies show that the patients who have the highest success are females, younger patients, those with milder disease, and less severe obesity. 3, 6.   Finding a dentist that practices dental sleep medicine  Specific training is available through the American Academy of Dental Sleep Medicine for dentists interested in working in the field of sleep. To find a dentist who is educated in the field of sleep and the use of oral appliances, near you, visit the Web site of the American Academy of Dental Sleep Medicine.    References  1. Dany et al. Objectively measured vs self-reported compliance during oral appliance therapy for sleep-disordered breathing. Chest 2013; 144(5): 6911-0788.  2. Melissa et al. Objective measurement of compliance during oral appliance therapy for sleep-disordered breathing. Thorax 2013; 68(1): 91-96.  3. Dorothea, et al. Mandibular advancement devices in 620 men and women with LEV and snoring: tolerability and predictors of treatment success. Chest 2004; 125: 3470-2633.  4. Ross, et al. Oral appliances for snoring and LEV: a review. Sleep 2006; 29: 244-262.  5. Pedrito, et al. Oral appliance treatment for LEV: an update. J Clin Sleep Med 2014; 10(2): 215-227.  6. Tommy et al. Predictors of OSAH treatment  outcome. J Casey Res 2007; 86: 3305-6574.  Weight Loss:    Weight loss is a long-term strategy that may improve sleep apnea in some patients.    Weight management is a personal decision and the decision should be based on your interest and the potential benefits.  If you are interested in exploring weight loss strategies, the following discussion covers the impact on weight loss on sleep apnea and the approaches that may be successful.    Being overweight does not necessarily mean you will have health consequences.  Those who have BMI over 35 or over 27 with existing medical conditions carries greater risk.   Weight loss decreases severity of sleep apnea in most people with obesity. For those with mild obesity who have developed snoring with weight gain, even 15-30 pound weight loss can improve and occasionally eliminate sleep apnea.  Structured and life-long dietary and health habits are necessary to lose weight and keep healthier weight levels.     Though there may be significant health benefits from weight loss, long-term weight loss is very difficult to achieve- studies show success with dietary management in less than 10% of people. In addition, substantial weight loss may require years of dietary control and may be difficult if patients have severe obesity. In these cases, surgical management may be considered.  Finally, older individuals who have tolerated obesity without health complications may be less likely to benefit from weight loss strategies.      Your BMI is Body mass index is 32.92 kg/(m^2).  Weight management is a personal decision.  If you are interested in exploring weight loss strategies, the following discussion covers the approaches that may be successful. Body mass index (BMI) is one way to tell whether you are at a healthy weight, overweight, or obese. It measures your weight in relation to your height.  A BMI of 18.5 to 24.9 is in the healthy range. A person with a BMI of 25 to 29.9 is  considered overweight, and someone with a BMI of 30 or greater is considered obese. More than two-thirds of American adults are considered overweight or obese.  Being overweight or obese increases the risk for further weight gain. Excess weight may lead to heart disease and diabetes.  Creating and following plans for healthy eating and physical activity may help you improve your health.  Weight control is part of healthy lifestyle and includes exercise, emotional health, and healthy eating habits. Careful eating habits lifelong are the mainstay of weight control. Though there are significant health benefits from weight loss, long-term weight loss with diet alone may be very difficult to achieve- studies show long-term success with dietary management in less than 10% of people. Attaining a healthy weight may be especially difficult to achieve in those with severe obesity. In some cases, medications, devices and surgical management might be considered.  What can you do?  If you are overweight or obese and are interested in methods for weight loss, you should discuss this with your provider.     Consider reducing daily calorie intake by 500 calories.     Keep a food journal.     Avoiding skipping meals, consider cutting portions instead.    Diet combined with exercise helps maintain muscle while optimizing fat loss. Strength training is particularly important for building and maintaining muscle mass. Exercise helps reduce stress, increase energy, and improves fitness. Increasing exercise without diet control, however, may not burn enough calories to loose weight.       Start walking three days a week 10-20 minutes at a time    Work towards walking thirty minutes five days a week     Eventually, increase the speed of your walking for 1-2 minutes at time    In addition, we recommend that you review healthy lifestyles and methods for weight loss available through the National Institutes of Health patient information  sites:  http://win.niddk.nih.gov/publications/index.htm    And look into health and wellness programs that may be available through your health insurance provider, employer, local community center, or [employer] club.    Weight management plan: Patient was referred to their PCP to discuss a diet and exercise plan.    Surgery:  Surgery for obstructive sleep apnea is considered generally only when other therapies fail to work. Surgery may be discussed with you if you are having a difficult time tolerating CPAP and or when there is an abnormal structure that requires surgical correction.  Nose and throat surgeries often enlarge the airway to prevent collapse.  Most of these surgeries create pain for 1-2 weeks and up to half of the most common surgeries are not effective throughout life.  You should carefully discuss the benefits and drawbacks to surgery with your sleep provider and surgeon to determine if it is the best solution for you.   More information  Surgery for LEV is directed at areas that are responsible for narrowing or complete obstruction of the airway during sleep.  There are a wide range of procedures available to enlarge and/or stabilize the airway to prevent blockage of breathing in the three major areas where it can occur: the palate, tongue, and nasal regions.  Successful surgical treatment depends on the accurate identification of the factors responsible for obstructive sleep apnea in each person.  A personalized approach is required because there is no single treatment that works well for everyone.  Because of anatomic variation, consultation with an examination by a sleep surgeon is a critical first step in determining what surgical options are best for each patient.  In some cases, examination during sedation may be recommended in order to guide the selection of procedures.  Patients will be counseled about risks and benefits as well as the typical recovery course after surgery. Surgery is typically  not a cure for a person s LEV.  However, surgery will often significantly improve one s LEV severity (termed  success rate ).  Even in the absence of a cure, surgery will decrease the cardiovascular risk associated with OSA7; improve overall quality of life8 (sleepiness, functionality, sleep quality, etc).  Palate Procedures:  Patients with LEV often have narrowing of their airway in the region of their tonsils and uvula.  The goals of palate procedures are to widen the airway in this region as well as to help the tissues resist collapse.  Modern palate procedure techniques focus on tissue conservation and soft tissue rearrangement, rather than tissue removal.  Often the uvula is preserved in this procedure. Residual sleep apnea is common in patient after pharyngoplasty with an average reduction in sleep apnea events of 33%2.    Tongue Procedures:  ExamWhile patients are awake, the muscles that surround the throat are active and keep this region open for breathing. These muscles relax during sleep, allowing the tongue and other structures to collapse and block breathing.  There are several different tongue procedures available.  Selection of a tongue base procedure depends on characteristics seen on physical exam.  Generally, procedures are aimed at removing bulky tissues in this area or preventing the back of the tongue from falling back during sleep.  Success rates for tongue surgery range from 50-62%3.  Hypoglossal Nerve Stimulation:  Hypoglossal nerve stimulation has recently received approval from the [place] Food and Drug Administration for the treatment of obstructive sleep apnea.  This is based on research showing that the system was safe and effective in treating sleep apnea6.  Results showed that the median AHI score decreased 68%, from 29.3 to 9.0. This therapy uses an implant system that senses breathing patterns and delivers mild stimulation to airway muscles, which keeps the airway open during  sleep.  The system consists of three fully implanted components: a small generator (similar in size to a pacemaker), a breathing sensor, and a stimulation lead.  Using a small handheld remote, a patient turns the therapy on before bed and off upon awakening.    Candidates for this device must be greater than 22 years of age, have moderate to severe LEV (AHI between 20-65), BMI less than 32, have tried CPAP/oral appliance without success, and have appropriate upper airway anatomy (determined by a sleep endoscopy performed by Dr. Alvarado).  Hypoglossal Nerve Stimulation Pathway:    The sleep surgeon s office will work with the patient through the insurance prior-authorization process (including communications and appeals).    Nasal Procedures:  Nasal obstruction can interfere with nasal breathing during the day and night.  Studies have shown that relief of nasal obstruction can improve the ability of some patients to tolerate positive airway pressure therapy for obstructive sleep apnea1.  Treatment options include medications such as nasal saline, topical corticosteroid and antihistamine sprays, and oral medications such as antihistamines or decongestants. Non-surgical treatments can include external nasal dilators for selected patients. If these are not successful by themselves, surgery can improve the nasal airway either alone or in combination with these other options.  Combination Procedures:  Combination of surgical procedures and other treatments may be recommended, particularly if patients have more than one area of narrowing or persistent positional disease.  The success rate of combination surgery ranges from 66-80%2,3.    References  1. Jerardo VILLELA. The Role of the Nose in Snoring and Obstructive Sleep Apnoea: An Update.  Eur Arch Otorhinolaryngol. 2011; 268: 1365-73.  2.  Gisela SM; Jose Luis JA; Eugenia JR; Pallanch JF; Roberto Carlos ONEAL; Tessa KIRBY; Monica REYNAGA. Surgical modifications of the upper airway for obstructive  sleep apnea in adults: a systematic review and meta-analysis. SLEEP 2010;33(10):5141-4438. Nithin PARDO. Hypopharyngeal surgery in obstructive sleep apnea: an evidence-based medicine review.  Arch Otolaryngol Head Neck Surg. 2006 Feb;132(2):206-13.  3. Rojelio YH1, Jennifer Y, Loc MAURO. The efficacy of anatomically based multilevel surgery for obstructive sleep apnea. Otolaryngol Head Neck Surg. 2003 Oct;129(4):327-35.  4. Kezirian E, Goldberg A. Hypopharyngeal Surgery in Obstructive Sleep Apnea: An Evidence-Based Medicine Review. Arch Otolaryngol Head Neck Surg. 2006 Feb;132(2):206-13.  5. Obey WALL et al. Upper-Airway Stimulation for Obstructive Sleep Apnea.  N Engl J Med. 2014 Jan 9;370(2):139-49.  6. Jack Y et al. Increased Incidence of Cardiovascular Disease in Middle-aged Men with Obstructive Sleep Apnea. Am J Respir Crit Care Med; 2002 166: 159-165  7. Jose EM et al. Studying Life Effects and Effectiveness of Palatopharyngoplasty (SLEEP) study: Subjective Outcomes of Isolated Uvulopalatopharyngoplasty. Otolaryngol Head Neck Surg. 2011; 144: 623-631.    General recommendations for sleep problems (Insomnia)  Allow 2-4 weeks to see results     Establish a regular sleep schedule    Most people only need 7-8 hours of sleep.  Don't be in bed longer than you need     to sleep or you will end up spending more time awake in bed. This trains your    brain to think of the bed as a place to not sleep.  Go to bed at same time each night   Get up at same time each day - Set an alarm everyday (even weekends). This is one of    the most important tips. It prevents you from relying on your insomnia to get you    up on time for your day. That actually reinforces insomnia. It also will help your    body get into a pattern where you start feeling tired at a consistent time each    night.  The body functions best when you keep a consistent routine.  Avoid sleeping-in and napping. Anytime you sleep during the day, you will be less  tired at    night. You may be tired enough to fall asleep, but you will wake more in the    middle of the night because you will have met your sleep need before the night is    done.   Cut down time in bed (if not asleep, get up)- Use your bed only for sleep and sex    Anytime you spend time in bed doing activities other than sleep (reading,    watching TV, working, playing on the computer or phone, or even just laying in    bed trying to sleep), you are training  your brain to think of the bed as a place to    do activities other than sleep. If you are not falling asleep within 20-30 minutes,    get out of bed. While out of bed, avoid bright lights. Avoid work or chores. Being    productive in the middle of the night reinforces waking up at night. Find relaxing,    not particularly entertaining activities like reading, listening to music, or relaxation    exercises. Go back to bed if you start feeling groggy, or after about 30 minutes,    even if not feeling very tired. Sometimes, just getting out of bed stops the pattern    of getting frustrated about laying in bed not sleeping, and that can help you fall    asleep.   Avoid trying to force yourself to sleep- sleep is not like everything else. The harder you    work at most things, the more you can accomplish. The harder you work at    sleep, the less you will sleep.     Make the bedroom comfortable - quiet, dark and cool are better. Consider ear plugs    (silicon). Use dark blinds or wear an eye mask if needed     Make a relaxing routine prior to bedtime  Relaxation exercises:   Progressive muscle relaxation: Relax each muscle group individually    Begin with your feet, flex, then relax. Try to imagine your feet feeling heavy and sinking into the bed. Move to your calves, do the same thing. Work through each muscle group toward your head.    Relaxing Mental Imagery: Try to imagine a trip that you took and found relaxing, or imagine a day at the beach. Try to walk  yourself through the day in your mind as if you were dreaming it. Try to imagine sensing the different experiences, such as feeling sand between your toes, the heat of the sun on your skin, seeing the waves crashing the shore, the smell of the salt water, etc.     Deal with your worries before bedtime    Set aside a worry time around dinner time for 10-15 minutes. Write down the    things that are on your mind. Plan time in the coming days to address those    issues. Brainstorm ideas on how you will deal with them. Try to identify issues    that are out of your control, and try to let those issues go.  Listen to relaxation tapes   Classical Music or Nature sounds   Back Massage   Get regular exercise each day (at least 1-2 hours before bedtime)   Take medications only as directed   Eat a light bedtime snack or warm drink   Warm milk   Warm herbal tea (non-caffeinated)       Things to avoid   No overstimulating activities just before bed   No competitive games before bedtime   No exciting television programs before bedtime   Avoid caffeine after lunchtime   Avoid chocolate   Do not use alcohol to induce sleep (worsens Insomnia)   Do not take someone else's sleeping pills   Do not look at the clock when awakening   Do not turn on light when getting up to use bathroom, use a nightlight     Online Programs     www.SHUTi.me (pronounced shut eye). There is a fee for this program. Enter the code  [place]  if you decide to enroll in this program.      www.sleepIO.com (pronounced sleep ee oh). There is a fee for this program. Enter the code  [place]  if you decide to enroll in this program.     Suggested Resources  Insomnia Treatment Books     Overcoming Insomnia by Marshall Kessler and Letha Arambula (2008)    No More Sleepless Nights by Taz Patricia and Zulema Marinelli (1996)    Say Stefani to Insomnia by Jeffry Henriquez (2009)    The Insomnia Workbook by Nuris Hilton and Jas Gilmore (2009)    The Insomnia  Answer by Juan Diego Nova and Thanh Zhu (2006)      Stress Management and Relaxation Books    The Relaxation and Stress Reduction Workbook by Meryl Mendoza, Skylar Romo and Tylor Thorne (2008)    Stress Management Workbook: Techniques and Self-Assessment Procedures by Priyanka Light and Jose G Hernadez (1997)    A Mindfulness-Based Stress Reduction Workbook by Albert Salazar and Lisa Farfan (2010)    The Complete Stress Management Workbook by Rafy Chavarria, Marcio Persaud and Calvin Garcia (1996)    Assert Yourself by Angi De La Rosa and Jesus De La Rosa (1977)    Relaxation Resources for Computer Download   These websites offer resources to help you relax. This list is for information only. [facility] is not responsible for the quality of services or the actions of any person or organization.  Progressive Muscle Relaxation (PMR):     http://www.[employer]/progressive-muscle-relaxation-exercise.html     http://studentsupport.[facility]/counseling/resources/self-help/relaxation-and-stress-management/   Deep Breathing Exercises:    http://www.[employer]/breathing-awareness.html     Meditation:     www.[facility]    www.[employer] [facility]itation-site.com You may have to pay for some of these resources.    Guided Imagery:    http://www.[employer]/guided-imagery-scripts.html     http://[employer]/library/zcwpxiwiad-likwfd-nfbhzke/     Counseling / Behavioral Health  [facility]  Visit [email or web address] or call [phone] to find a clinic close to you.  Or call [phone] for [facility].

## 2018-11-29 NOTE — NURSING NOTE
"Chief Complaint   Patient presents with     Sleep Problem     Referred for possible apnea       Initial /90  Pulse 79  Resp 16  Ht 1.575 m (5' 2\")  Wt 81.6 kg (180 lb)  SpO2 97%  BMI 32.92 kg/m2 Estimated body mass index is 32.92 kg/(m^2) as calculated from the following:    Height as of this encounter: 1.575 m (5' 2\").    Weight as of this encounter: 81.6 kg (180 lb).    Medication Reconciliation: complete    ESS 6  Neck 34cm  Arti Nuno    "

## 2019-01-04 ENCOUNTER — THERAPY VISIT (OUTPATIENT)
Dept: SLEEP MEDICINE | Facility: CLINIC | Age: 49
End: 2019-01-04
Payer: COMMERCIAL

## 2019-01-04 DIAGNOSIS — G71.11 MYOTONIC DYSTROPHY (H): ICD-10-CM

## 2019-01-04 DIAGNOSIS — R40.0 SLEEPINESS: ICD-10-CM

## 2019-01-04 PROCEDURE — 95811 POLYSOM 6/>YRS CPAP 4/> PARM: CPT | Performed by: PSYCHIATRY & NEUROLOGY

## 2019-01-05 NOTE — PROGRESS NOTES
Completed a split night PSG per provider order.    Preliminary AHI >30.  A final therapeutic PAP pressure was not achieved.    Supine REM was not seen on therapeutic pressure.    Patient reports feeling not refreshed in AM.

## 2019-01-07 LAB — SLPCOMP: NORMAL

## 2019-01-07 NOTE — PROCEDURES
" SLEEP STUDY INTERPRETATION  SPLIT NIGHT STUDY      Patient: SHARON HERRERA  YOB: 1970  Study Date: 1/4/2019  MRN: [id number]  Referring Provider: Self Referred  Ordering Provider: Mahesh Lara    Indications for Polysomnography: The patient is a 48 y old Female who is 5' 2\" and weighs 180.0 lbs. Her BMI is 33.1, [place] sleepiness scale 6.0 and neck circumference is 34.0 cm. Relevant medical history includes myotonic dystrophy, snoring, ? apnea. A diagnostic polysomnogram was performed to evaluate for sleep apnea/hypoventilation/hypoxemia. After 186.5 minutes of sleep time the patient exhibited sufficient respiratory events qualifying her for a CPAP trial which was then initiated.    Polysomnogram Data: A full night polysomnogram recorded the standard physiologic parameters including EEG, EOG, EMG, ECG, nasal and oral airflow. Respiratory parameters of chest and abdominal movements were recorded with respiratory inductance plethysmography. Oxygen saturation was recorded by pulse oximetry.  Hypopnea scoring rule used: 1B 4%    Diagnostic PSG  Sleep Architecture: Sleep fragmentation  The total recording time of the polysomnogram was 339.4 minutes. The total sleep time was 186.5 minutes. Sleep latency was 121.3 minutes. REM latency was 83.5 minutes. Arousal index was 55.0 arousals per hour. Sleep efficiency was 55.0%. Wake after sleep onset was 17.5 minutes. The patient spent 11.8% of total sleep time in Stage N1, 39.7% in Stage N2, 31.6% in Stage N3, and 16.9% in REM. Time in REM supine was 0 minutes.    Respiration: Moderate sleep disordered breathing best characterized as mixed (combination of both obstructive and central phenomena) with hypoxemia.  Of note the patient did not have REM supine during the study.      Events ? The polysomnogram revealed a presence of 3 obstructive, 20 central, and - mixed apneas resulting in an apnea index of 7.4 events per hour. There were 58 obstructive " hypopneas and 3 central hypopneas resulting in an obstructive hypopnea index of 18.7 and central hypopnea index of 1.0 events per hour. The combined apnea/hypopnea index was 27.0 events per hour (central apnea/hypopnea index was 7.4 events per hour).  The REM AHI was 59.0 events per hour. The supine AHI was - events per hour. The RERA index was 2.3 events per hour. The RDI was 29.3 events per hour.    Snoring - was reported as minimal.    Respiratory rate and pattern - was notable for normal/tachypnea/Cheyne-Fritz respiratory rate and pattern.    Sustained Sleep Associated Hypoventilation - Transcutaneous carbon dioxide monitoring was used, however significant hypoventilation was not present with 0 minutes at or greater than 55 mmHg.    Sleep Associated Hypoxemia - (Greater than 5 minutes O2 sat at or below 88%) was present. Baseline oxygen saturation was 87.7%. Lowest oxygen saturation was 71.5%. Time spent less than or equal to 88% was 132.2 minutes. Time spent less than or equal to 89% was 148.3 minutes.     Treatment PSG  Sleep Architecture: Continued sleep fragmentation  At 04:00:19 AM the patient was placed on PAP treatment and was titrated at pressures ranging from CPAP 5 cmH2O up to CPAP 13 cmH2O. The total recording time of the treatment portion of the study was 234.3 minutes. The total sleep time was 149.5 minutes. During the treatment portion of the study the sleep latency was 26.5 minutes. REM latency was 136.0 minutes. Arousal index was 102.3 arousals per hour. Sleep efficiency was 63.8%. Wake after sleep onset was 58.0 minutes. The patient spent 27.8% of total sleep time in Stage N1, 62.2% in Stage N2, 0.0% in Stage N3, and 10.0% in REM. Time in REM supine was - minutes.     Respiration: Sleep disordered breathing noted on the baseline portion of the study was improved, but sleep fragmentation persisted, with CPAP therapy.     The final pressure was CPAP 13 cmH2O with an AHI of - events per hour. Time  in REM supine on final pressure was - minutes.     Movement Activity:     Periodic Limb Movements  o During the diagnostic portion of the study, there were 10 PLMs recorded. The PLM index was 3.2 movements per hour. The PLM Arousal Index was 1.9 per hour.  o During the treatment portion of the study, there were 23 PLMs recorded. The PLM index was 9.2 movements per hour. The PLM Arousal Index was 5.2 per hour.    REM EMG Activity - Excessive muscle activity was not present.    Nocturnal Behavior - Abnormal sleep related behaviors were not noted.    Bruxism - None apparent.    Cardiac Summary: Predominantly narrow QRS complexes preceded by P waves suggestive of sinus rhythm.  Intermittent tachycardia  During the diagnostic portion of the study, the average pulse rate was 63.9 bpm. The minimum pulse rate was 50.0 bpm while the maximum pulse rate was 111.0 bpm.    During the treatment portion of the study, the average pulse rate was 58.9 bpm. The minimum pulse rate was 49.9 bpm while the maximum pulse rate was 75.1 bpm.     Assessment:     Moderate sleep disordered breathing best characterized as mixed (combination of both obstructive and central phenomena) with hypoxemia.  Of note the patient did not have REM supine during the study.      Sleep disordered breathing noted on the baseline portion of the study was improved, but sleep fragmentation persisted, with CPAP therapy.    Myotonic dystrophy  Recommendations:    Treatment of LEV with Auto?titrating PAP therapy. Recommend clinical follow up with sleep management team, including review of compliance measures.  o Optimize the duration and circadian timing of sleep.   o Address any persistent insomnia with CBT-I    Monitor for myotonic dystrophy associated hypersomnia and if present consider stimulant therapy.      Advice regarding the risks of drowsy driving.    Suggest optimizing sleep schedule and avoiding sleep deprivation.    Weight management     Diagnostic  Code(s): G47.33, G47.36, G47.9      Mahesh Lara MD 1-7-2019

## 2023-02-08 ENCOUNTER — MEDICAL CORRESPONDENCE (OUTPATIENT)
Dept: HEALTH INFORMATION MANAGEMENT | Facility: CLINIC | Age: 53
End: 2023-02-08